# Patient Record
Sex: FEMALE | Race: WHITE
[De-identification: names, ages, dates, MRNs, and addresses within clinical notes are randomized per-mention and may not be internally consistent; named-entity substitution may affect disease eponyms.]

---

## 2019-07-11 ENCOUNTER — HOSPITAL ENCOUNTER (OUTPATIENT)
Dept: HOSPITAL 88 - OR | Age: 54
Discharge: HOME | End: 2019-07-11
Attending: PODIATRIST
Payer: COMMERCIAL

## 2019-07-11 VITALS — DIASTOLIC BLOOD PRESSURE: 62 MMHG | SYSTOLIC BLOOD PRESSURE: 118 MMHG

## 2019-07-11 DIAGNOSIS — Z91.018: ICD-10-CM
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
## 2019-07-11 DIAGNOSIS — Z01.810: ICD-10-CM

## 2019-07-11 DIAGNOSIS — S93.492A: Primary | ICD-10-CM

## 2019-07-11 DIAGNOSIS — S82.892A: ICD-10-CM

## 2019-07-11 DIAGNOSIS — J45.909: ICD-10-CM

## 2019-07-11 PROCEDURE — 27769 OPTX POST ANKLE FX: CPT

## 2019-07-11 PROCEDURE — 93005 ELECTROCARDIOGRAM TRACING: CPT

## 2019-07-11 PROCEDURE — 27829 TREAT LOWER LEG JOINT: CPT

## 2019-07-11 NOTE — OPERATIVE REPORT
DATE OF PROCEDURE:  07/11/2019

 

SURGEON:  Jennifer Rg DPM

 

PREOPERATIVE DIAGNOSES:  

1. Left posterior malleolus fracture.

2. Left ruptured ankle syndesmosis.

 

POSTOPERATIVE DIAGNOSES:  

1. Left posterior malleolus fracture.

2. Left ruptured ankle syndesmosis.

 

PLANNED PROCEDURE:  

1. Left open reduction and internal fixation of posterior malleolus.

2. Left repair of syndesmosis.

 

ASSISTANT:  Jennifer Rg DPM

 

ANESTHESIA:  General with a posterior block consisting of 30 mL of 0.5% Marcaine plain

mixed with 1 mL of dexamethasone phosphate. 

 

HEMOSTASIS:  Pneumatic thigh tourniquet set at 350 mmHg for a total time of

approximately 1 hour. 

 

MATERIALS:  One 4.0 cannulated x 35 mm [employer] cortical bone screw; two [employer]

[employer] SYNCHFIX syndesmotic repair; one [employer] plate and two [employer] 2.7

mm x 14 mm bone screws, one locking and one nonlocking, one four-hole medial plate. 

 

PATHOLOGY:  None.

 

ESTIMATED BLOOD LOSS:  Less than 10 mL.

 

PROCEDURE NOTE:  The patient was seen in the preoperative waiting room, where the

correct procedure and site was identified.  The patient was brought to the operating

room and placed on the operating table, where general anesthesia was initiated.  A

well-padded pneumatic tourniquet was placed about the patient's left thigh.  The patient

was then flipped into the prone position.  The left foot, ankle, and leg were then

scrubbed, prepped, and draped in the usual aseptic manner.  The left foot, ankle, and

leg was exsanguinated with an Esmarch bandage and the pneumatic thigh tourniquet was

inflated to 350 mmHg for a total time of approximately 1 hour.  Attention was directed

to the posterior aspect of the patient's left heel and a posterolateral incision was

made just along the lateral border of the Achilles tendon.  The incision was carried

through subcutaneous tissue differentiating them from superficial underlying structures.

 All vital neurovascular structures including the sural nerve were identified and

retracted medial and lateral to allow for good visualization of the posterior aspect of

the ankle joint.  At this time, in the recess between the peroneal tendons and the

flexor hallucis longus tendon, the plane was dissected down to the level of the tibia

where the posterior malleolar fracture was easily identified and the posterior inferior

tibiofibular ligament was noted to be intact.  Utilizing intraoperative fluoroscopy, the

fracture site was reduced and utilizing a guidewire for temporary fixation, a 4.0

cannulated bone screw was placed across the fracture site and noted to be intact.  This

was confirmed via intraoperative fluoroscopy.  The wound was then copiously irrigated

with sterile saline.  Capsule and deep tissue were reapproximated with 3-0 Vicryl,

subcutaneous tissue with 3-0 Vicryl, and the skin was closed using a running

interlocking stitch with 4-0 nylon. 

 

Attention was then directed to the lateral malleolus, where a 4 cm linear incision was

made proximal to the malleolus.  The incision was carried through subcutaneous tissue

 them from deep or underlying structures.  All vital neurovascular structures

were identified and retracted medial and lateral and all bleeders were cauterized or

ligated as deemed necessary.  At this time, the fibula was identified and explored and

there was noted to be no fibular fragment.  At this time, the decision was made to

reduce the syndesmosis by applying a four-hole plate for buttressing of the SYNCHFIX

fixation.  Utilizing intraoperative fluoroscopy, the plate was placed and noted to be in

good anatomic alignment and was permanently fixated to the bone utilizing one 14 mm

nonlocking screw and one 14 mm locking screw at the superior and inferior borders.

Next, utilizing manufacture protocol, the SYNCHFIX was countersunk and a needle was

passed through both the two central holes under intraoperative fluoroscopy, the button

was placed on the medial side through a stab incision and reapproximated down to the

bone while having the syndesmotic ligament reduced with a pelvic bone reduction forceps.

 Both SYNCHFIXs were tied down to anatomic alignment, the pelvic reduction forceps was

removed and there was noted to be no gaping at the medial clear space and good tib-fib

overlap.  Both wounds on the medial and lateral were flushed copiously with sterile

saline.  The deep tissue was reapproximated with 3-0 Vicryl, subcutaneous tissue with

3-0 Vicryl, and the skin was closed using simple interrupted sutures on the medial side

and simple interlocking suture on the lateral side.  All wounds were then dressed with

Adaptic, 4x4s, Kerlix, Webril, 4 x 30 posterior splint, 4 inch Ace wrap, and a 6 inch

Ace wrap. 

 

The patient tolerated procedure and anesthesia well.  The patient was transferred to

postop recovery unit with vital signs stable and vascular status intact.  The patient

will be monitored there for a short period time before being sent home with the

following written and oral instructions. 

1. Keep the dressing clean, dry, and intact.

2. The patient is to remain nonweightbearing in the posterior splint to avoid any

ambulation 

until being seen in the office.

3. The patient is given the office number to contact us if any problem should arise.

 

 

 

 

______________________________

[facility]

D:  07/11/2019 08:15:11

T:  07/11/2019 11:14:07

Job #:  [id number]

## 2023-04-25 ENCOUNTER — HOSPITAL ENCOUNTER (OUTPATIENT)
Dept: HOSPITAL 88 - US | Age: 58
End: 2023-04-25
Payer: COMMERCIAL

## 2023-04-25 DIAGNOSIS — R16.0: Primary | ICD-10-CM

## 2023-04-25 PROCEDURE — 76700 US EXAM ABDOM COMPLETE: CPT
